# Patient Record
Sex: MALE | Race: BLACK OR AFRICAN AMERICAN | NOT HISPANIC OR LATINO | Employment: OTHER | URBAN - METROPOLITAN AREA
[De-identification: names, ages, dates, MRNs, and addresses within clinical notes are randomized per-mention and may not be internally consistent; named-entity substitution may affect disease eponyms.]

---

## 2022-12-22 ENCOUNTER — TELEPHONE (OUTPATIENT)
Dept: NEUROLOGY | Facility: CLINIC | Age: 42
End: 2022-12-22
Payer: OTHER GOVERNMENT

## 2022-12-22 NOTE — TELEPHONE ENCOUNTER
----- Message from Fran Robertson sent at 12/22/2022  4:26 PM CST -----  Regarding:  REFERRAL  Contact: Self  Pt stated he is being referred to Dr Matthew Higgins for his back and a MRI was taken at [facility] however no one has contact him to schedule an appointment Pt have been waiting for approximately 1 1/2 wks for someone to schedule an appointment Pt stated he is willing to see one of Dr Higgins Assistants for the first appointment if unable to seen Dr Higgins anytime soon as long as he will eventually get to see Dr Ybarra Pt ask for a call back as soon as possible today please      Contact info   [phone] (home)

## 2022-12-22 NOTE — TELEPHONE ENCOUNTER
Spoke with the patient, informed that provider does not treat sciatica and will be in contact with department patient navigators to be scheduled appropriately.

## 2022-12-28 ENCOUNTER — HOSPITAL ENCOUNTER (OUTPATIENT)
Dept: RADIOLOGY | Facility: HOSPITAL | Age: 42
Discharge: HOME | End: 2022-12-28
Attending: ORTHOPAEDIC SURGERY
Payer: OTHER GOVERNMENT

## 2022-12-28 ENCOUNTER — OFFICE VISIT (OUTPATIENT)
Dept: ORTHOPEDICS | Facility: CLINIC | Age: 42
End: 2022-12-28
Payer: OTHER GOVERNMENT

## 2022-12-28 VITALS — BODY MASS INDEX: 35.59 KG/M2 | HEIGHT: 67 IN | WEIGHT: 226.75 LBS

## 2022-12-28 DIAGNOSIS — M51.36 DDD (DEGENERATIVE DISC DISEASE), LUMBAR: ICD-10-CM

## 2022-12-28 DIAGNOSIS — M25.551 ACUTE RIGHT HIP PAIN: ICD-10-CM

## 2022-12-28 DIAGNOSIS — M54.50 ACUTE RIGHT-SIDED LOW BACK PAIN, UNSPECIFIED WHETHER SCIATICA PRESENT: ICD-10-CM

## 2022-12-28 PROCEDURE — 72110 X-RAY EXAM L-2 SPINE 4/>VWS: CPT | Mod: TC

## 2022-12-28 PROCEDURE — 99204 OFFICE O/P NEW MOD 45 MIN: CPT | Mod: S$PBB,,, | Performed by: ORTHOPAEDIC SURGERY

## 2022-12-28 PROCEDURE — 72110 X-RAY EXAM L-2 SPINE 4/>VWS: CPT | Mod: 26,,, | Performed by: RADIOLOGY

## 2022-12-28 PROCEDURE — 99213 OFFICE O/P EST LOW 20 MIN: CPT | Mod: PBBFAC | Performed by: ORTHOPAEDIC SURGERY

## 2022-12-28 PROCEDURE — 99999 PR PBB SHADOW E&M-EST. PATIENT-LVL III: ICD-10-PCS | Mod: PBBFAC,,, | Performed by: ORTHOPAEDIC SURGERY

## 2022-12-28 PROCEDURE — 99999 PR PBB SHADOW E&M-EST. PATIENT-LVL III: CPT | Mod: PBBFAC,,, | Performed by: ORTHOPAEDIC SURGERY

## 2022-12-28 PROCEDURE — 72110 XR LUMBAR SPINE AP AND LAT WITH FLEX/EXT: ICD-10-PCS | Mod: 26,,, | Performed by: RADIOLOGY

## 2022-12-28 PROCEDURE — 99204 PR OFFICE/OUTPT VISIT, NEW, LEVL IV, 45-59 MIN: ICD-10-PCS | Mod: S$PBB,,, | Performed by: ORTHOPAEDIC SURGERY

## 2022-12-28 RX ORDER — METHYLPREDNISOLONE 4 MG/1
TABLET ORAL
Qty: 1 EACH | Refills: 0 | Status: SHIPPED | OUTPATIENT
Start: 2022-12-28 | End: 2023-01-18

## 2022-12-28 RX ORDER — GABAPENTIN 100 MG/1
100 CAPSULE ORAL 3 TIMES DAILY
Qty: 90 CAPSULE | Refills: 11 | Status: SHIPPED | OUTPATIENT
Start: 2022-12-28 | End: 2023-12-28

## 2022-12-28 NOTE — PROGRESS NOTES
"DATE: 12/28/2022  PATIENT: Polo Reddy    Supervising Physician: Jimmy Bernard M.D.    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  The pain in the right side of the lower back and on the side of the right leg is what bothers him most.  The pain has been present since 5/30/22 when patient was dead lifting weights and "heard three pops in his back". He presented to the ED immediately after for evaluation. The patient describes the pain as aching and radiating down his right hip.  The pain is worse with walking and improved by nothing. There is negative associated numbness and tingling. There is positive subjective weakness. Prior treatments have included PT for over 6 weeks with minimal relief, but no ESIs or surgery.    The patient denies myelopathic symptoms such as handwriting changes or difficulty with buttons/coins/keys. Denies perineal paresthesias, bowel/bladder dysfunction.    PAST MEDICAL/SURGICAL HISTORY:  Past Medical History:   Diagnosis Date    Depression     Hypertension      Past Surgical History:   Procedure Laterality Date    KNEE SURGERY Right        Medications:   Current Outpatient Medications on File Prior to Visit   Medication Sig Dispense Refill    amLODIPine (NORVASC) 5 MG tablet Take 5 mg by mouth once daily.      hydroCHLOROthiazide (MICROZIDE) 12.5 mg capsule Take 12.5 mg by mouth once daily.       No current facility-administered medications on file prior to visit.       Social History:   Social History     Socioeconomic History    Marital status:    Tobacco Use    Smoking status: Some Days    Smokeless tobacco: Never       REVIEW OF SYSTEMS:  Constitution: Negative. Negative for chills, fever and night sweats.   Cardiovascular: Negative for chest pain and syncope.   Respiratory: Negative for cough and shortness of breath. " "  Gastrointestinal: See HPI. Negative for nausea/vomiting. Negative for abdominal pain.  Genitourinary: See HPI. Negative for discoloration or dysuria.  Skin: Negative for dry skin, itching and rash.   Hematologic/Lymphatic: Negative for bleeding problem. Does not bruise/bleed easily.   Musculoskeletal: Negative for falls and muscle weakness.   Neurological: See HPI. No seizures.   Endocrine: Negative for polydipsia, polyphagia and polyuria.   Allergic/Immunologic: Negative for hives and persistent infections.     EXAM:  Ht 5' 7" (1.702 m)   Wt 102.9 kg (226 lb 11.9 oz)   BMI 35.51 kg/m²     General: The patient is a very pleasant 42 y.o. male in no apparent distress, the patient is oriented to person, place and time.  Psych: Normal mood and affect  HEENT: Vision grossly intact, hearing intact to the spoken word.  Lungs: Respirations unlabored.  Gait: Antalgic station and gait, no difficulty with toe or heel walk.   Skin: Dorsal lumbar skin negative for rashes, lesions, hairy patches and surgical scars. There is negative lumbar tenderness to palpation.  Range of motion: Lumbar range of motion is acceptable.  Spinal Balance: Global saggital and coronal spinal balance acceptable, not significant for scoliosis and kyphosis.  Musculoskeletal: No pain with the range of motion of the bilateral hips. No trochanteric tenderness to palpation.  Vascular: Bilateral lower extremities warm and well perfused, dorsalis pedis pulses 2+ bilaterally.  Neurological: Normal strength and tone in all major motor groups in the bilateral lower extremities. Normal sensation to light touch in the L2-S1 dermatomes bilaterally.  Deep tendon reflexes symmetric 2+ in the bilateral lower extremities.  Negative Babinski bilaterally. Straight leg raise negative bilaterally.    IMAGING:      Today I personally reviewed AP, Lat and Flex/Ex  upright L-spine films that demonstrate mild degenerative changes.    MRI lumbar demonstrates right foraminal " protrusion at L4-L5 with resultant encroachment of the right exiting L4 nerve root.     Body mass index is 35.51 kg/m².    No results found for: HGBA1C        ASSESSMENT/PLAN:    Polo RUTLEDGE was seen today for pain, pain and pain.    Diagnoses and all orders for this visit:    Acute right hip pain  -     Ambulatory referral/consult to Orthopedics    Acute right-sided low back pain, unspecified whether sciatica present  -     Ambulatory referral/consult to Orthopedics      Today we discussed at length all of the different treatment options including anti-inflammatories, acetaminophen, rest, ice, heat, physical therapy including strengthening and stretching exercises, home exercises, ROM, aerobic conditioning, aqua therapy, other modalities including ultrasound, massage, and dry needling, epidural steroid injections and finally surgical intervention.      Pt presents with chronic right lumbar radiculopathy. Failure of conservative rx. He is not interested in LISA at this time. Will continue home exercises and send gabapentin and medrol dose pack to pharmacy. I provided the patient with a home exercise program. It is the [facility] spine conditioning program. Exercises include head rolls, kneeling back extension, sitting rotation stretch, modified seated side straddle, knee to chest, bird dog, plank, modified seated plank, hip bridges, abdominal bracing, and abdominal crunch. Pt will complete each exercise 5 times daily for 6-8 weeks.  Pt will fu if pain persists.

## 2024-08-17 DIAGNOSIS — U07.1 COVID-19 VIRUS DETECTED: ICD-10-CM

## 2024-08-22 ENCOUNTER — NURSE TRIAGE (OUTPATIENT)
Dept: ADMINISTRATIVE | Facility: CLINIC | Age: 44
End: 2024-08-22
Payer: OTHER GOVERNMENT

## 2024-08-22 NOTE — TELEPHONE ENCOUNTER
OOC RN  Patient calling [facility] for Covid 8/17/24   hxt of DM  Sugar right now.  200   b/p  Care advise is to go to ED now. For any new or worsening symptoms to callback and number given and Patient VU.   Reason for Disposition   SEVERE or constant chest pain or pressure  (Exception: Mild central chest pain, present only when coughing.)    Additional Information   Negative: SEVERE difficulty breathing (e.g., struggling for each breath, speaks in single words)   Negative: Difficult to awaken or acting confused (e.g., disoriented, slurred speech)   Negative: Bluish (or gray) lips or face now   Negative: Shock suspected (e.g., cold/pale/clammy skin, too weak to stand, low BP, rapid pulse)   Negative: Sounds like a life-threatening emergency to the triager    Protocols used: Coronavirus (COVID-19) Diagnosed or [id number]

## 2025-05-19 PROBLEM — M79.644 FINGER PAIN, RIGHT: Status: ACTIVE | Noted: 2025-05-19

## 2025-05-19 PROBLEM — M79.18 MUSCULOSKELETAL PAIN: Status: ACTIVE | Noted: 2025-05-19

## 2025-05-19 PROBLEM — L03.011 CELLULITIS OF FINGER OF RIGHT HAND: Status: ACTIVE | Noted: 2025-05-19

## 2025-08-08 ENCOUNTER — OCCUPATIONAL HEALTH (OUTPATIENT)
Dept: URGENT CARE | Facility: CLINIC | Age: 45
End: 2025-08-08

## 2025-08-08 DIAGNOSIS — Z02.83 ENCOUNTER FOR DRUG SCREENING: Primary | ICD-10-CM
